# Patient Record
Sex: FEMALE
[De-identification: names, ages, dates, MRNs, and addresses within clinical notes are randomized per-mention and may not be internally consistent; named-entity substitution may affect disease eponyms.]

---

## 2018-06-20 ENCOUNTER — RX RENEWAL (OUTPATIENT)
Age: 28
End: 2018-06-20

## 2018-06-20 PROBLEM — Z00.00 ENCOUNTER FOR PREVENTIVE HEALTH EXAMINATION: Status: ACTIVE | Noted: 2018-06-20

## 2018-08-20 ENCOUNTER — RX RENEWAL (OUTPATIENT)
Age: 28
End: 2018-08-20

## 2018-08-21 ENCOUNTER — RX RENEWAL (OUTPATIENT)
Age: 28
End: 2018-08-21

## 2018-10-24 ENCOUNTER — APPOINTMENT (OUTPATIENT)
Dept: OBGYN | Facility: CLINIC | Age: 28
End: 2018-10-24
Payer: COMMERCIAL

## 2018-10-24 VITALS
WEIGHT: 130 LBS | HEIGHT: 62 IN | BODY MASS INDEX: 23.92 KG/M2 | DIASTOLIC BLOOD PRESSURE: 66 MMHG | SYSTOLIC BLOOD PRESSURE: 102 MMHG

## 2018-10-24 DIAGNOSIS — Z78.9 OTHER SPECIFIED HEALTH STATUS: ICD-10-CM

## 2018-10-24 DIAGNOSIS — Z80.8 FAMILY HISTORY OF MALIGNANT NEOPLASM OF OTHER ORGANS OR SYSTEMS: ICD-10-CM

## 2018-10-24 LAB
HCG UR QL: NEGATIVE
QUALITY CONTROL: YES

## 2018-10-24 PROCEDURE — 99213 OFFICE O/P EST LOW 20 MIN: CPT

## 2018-10-24 PROCEDURE — 81025 URINE PREGNANCY TEST: CPT

## 2019-01-30 RX ORDER — DROSPIRENONE AND ETHINYL ESTRADIOL 0.03MG-3MG
3-0.03 KIT ORAL DAILY
Qty: 90 | Refills: 0 | Status: DISCONTINUED | COMMUNITY
Start: 2018-06-20 | End: 2019-01-30

## 2019-01-31 ENCOUNTER — RX RENEWAL (OUTPATIENT)
Age: 29
End: 2019-01-31

## 2019-06-24 ENCOUNTER — RX RENEWAL (OUTPATIENT)
Age: 29
End: 2019-06-24

## 2019-09-09 ENCOUNTER — RX RENEWAL (OUTPATIENT)
Age: 29
End: 2019-09-09

## 2019-09-16 ENCOUNTER — RX RENEWAL (OUTPATIENT)
Age: 29
End: 2019-09-16

## 2019-09-24 ENCOUNTER — MEDICATION RENEWAL (OUTPATIENT)
Age: 29
End: 2019-09-24

## 2019-11-19 ENCOUNTER — MEDICATION RENEWAL (OUTPATIENT)
Age: 29
End: 2019-11-19

## 2020-01-06 ENCOUNTER — APPOINTMENT (OUTPATIENT)
Dept: OBGYN | Facility: CLINIC | Age: 30
End: 2020-01-06

## 2020-01-08 ENCOUNTER — APPOINTMENT (OUTPATIENT)
Dept: OBGYN | Facility: CLINIC | Age: 30
End: 2020-01-08
Payer: COMMERCIAL

## 2020-01-08 VITALS
BODY MASS INDEX: 24.84 KG/M2 | WEIGHT: 135 LBS | HEIGHT: 62 IN | SYSTOLIC BLOOD PRESSURE: 100 MMHG | DIASTOLIC BLOOD PRESSURE: 62 MMHG

## 2020-01-08 DIAGNOSIS — Z01.419 ENCOUNTER FOR GYNECOLOGICAL EXAMINATION (GENERAL) (ROUTINE) W/OUT ABNORMAL FINDINGS: ICD-10-CM

## 2020-01-08 DIAGNOSIS — Z85.828 PERSONAL HISTORY OF OTHER MALIGNANT NEOPLASM OF SKIN: ICD-10-CM

## 2020-01-08 DIAGNOSIS — Z11.3 ENCOUNTER FOR SCREENING FOR INFECTIONS WITH A PREDOMINANTLY SEXUAL MODE OF TRANSMISSION: ICD-10-CM

## 2020-01-08 DIAGNOSIS — Z80.8 FAMILY HISTORY OF MALIGNANT NEOPLASM OF OTHER ORGANS OR SYSTEMS: ICD-10-CM

## 2020-01-08 PROCEDURE — 99395 PREV VISIT EST AGE 18-39: CPT

## 2020-01-08 RX ORDER — ERGOCALCIFEROL (VITAMIN D2) 1250 MCG
50000 CAPSULE ORAL
Refills: 0 | Status: ACTIVE | COMMUNITY

## 2020-01-08 RX ORDER — FEXOFENADINE HYDROCHLORIDE 60 MG/1
60 TABLET, FILM COATED ORAL
Refills: 0 | Status: ACTIVE | COMMUNITY

## 2020-01-08 NOTE — PHYSICAL EXAM
[Awake] : awake [Acute Distress] : no acute distress [Alert] : alert [Thyroid Nodule] : no thyroid nodule [LAD] : no lymphadenopathy [Mass] : no breast mass [Goiter] : no goiter [Nipple Discharge] : no nipple discharge [Axillary LAD] : no axillary lymphadenopathy [Soft] : soft [Tender] : non tender [None] : no CVA tenderness [Distended] : not distended [H/Smegaly] : no hepatosplenomegaly [Depressed Mood] : not depressed [Oriented x3] : oriented to person, place, and time [Flat Affect] : affect not flat [No Lesions] : no genitalia lesions [Labia Majora] : labia major [Labia Minora] : labia minora [Pap Obtained] : a Pap smear was performed [No Bleeding] : there was no active vaginal bleeding [Normal] : clitoris [Motion Tenderness] : there was no cervical motion tenderness [RRR, No Murmurs] : RRR, no murmurs [Uterine Adnexae] : were not tender and not enlarged [CTAB] : CTAB

## 2020-01-08 NOTE — COUNSELING
[Vitamins/Supplements] : vitamins/supplements [STD (testing, results, tx)] : STD (testing, results, tx) [Contraception] : contraception

## 2020-01-08 NOTE — HISTORY OF PRESENT ILLNESS
[1 Year Ago] : 1 year ago [Good] : being in good health [Healthy Diet] : a healthy diet [Definite:  ___ (Date)] : the last menstrual period was [unfilled] [Reproductive Age] : is of reproductive age [Sexually Active] : is sexually active

## 2020-01-10 LAB
C TRACH RRNA SPEC QL NAA+PROBE: NOT DETECTED
N GONORRHOEA RRNA SPEC QL NAA+PROBE: NOT DETECTED
SOURCE TP AMPLIFICATION: NORMAL

## 2020-01-13 LAB — CYTOLOGY CVX/VAG DOC THIN PREP: NORMAL

## 2020-12-23 PROBLEM — Z01.419 ENCOUNTER FOR ROUTINE GYNECOLOGICAL EXAMINATION: Status: RESOLVED | Noted: 2020-01-08 | Resolved: 2020-12-23

## 2021-01-20 ENCOUNTER — APPOINTMENT (OUTPATIENT)
Dept: OBGYN | Facility: CLINIC | Age: 31
End: 2021-01-20
Payer: COMMERCIAL

## 2021-01-20 VITALS
WEIGHT: 135 LBS | BODY MASS INDEX: 24.84 KG/M2 | DIASTOLIC BLOOD PRESSURE: 70 MMHG | SYSTOLIC BLOOD PRESSURE: 102 MMHG | HEIGHT: 62 IN

## 2021-01-20 DIAGNOSIS — Z01.419 ENCOUNTER FOR GYNECOLOGICAL EXAMINATION (GENERAL) (ROUTINE) W/OUT ABNORMAL FINDINGS: ICD-10-CM

## 2021-01-20 DIAGNOSIS — Z30.41 ENCOUNTER FOR SURVEILLANCE OF CONTRACEPTIVE PILLS: ICD-10-CM

## 2021-01-20 PROCEDURE — 99072 ADDL SUPL MATRL&STAF TM PHE: CPT

## 2021-01-20 PROCEDURE — 99395 PREV VISIT EST AGE 18-39: CPT

## 2021-01-20 NOTE — PLAN
[FreeTextEntry1] : Pap, HPV today\par Rev'd pt to start PNV three months prior to trying to conceive.\par No CHC refill needed today, last refill was 10/20, pt to call when refills needed\par RTO in 1 year or sooner prn

## 2021-01-20 NOTE — PHYSICAL EXAM
[Appropriately responsive] : appropriately responsive [Alert] : alert [No Acute Distress] : no acute distress [No Lymphadenopathy] : no lymphadenopathy [Regular Rate Rhythm] : regular rate rhythm [No Murmurs] : no murmurs [Clear to Auscultation B/L] : clear to auscultation bilaterally [Soft] : soft [Non-tender] : non-tender [Non-distended] : non-distended [No HSM] : No HSM [Oriented x3] : oriented x3 [Examination Of The Breasts] : a normal appearance [No Masses] : no breast masses were palpable [Labia Majora] : normal [Labia Minora] : normal [No Bleeding] : There was no active vaginal bleeding [Normal] : normal [Uterine Adnexae] : normal

## 2021-01-25 LAB
CYTOLOGY CVX/VAG DOC THIN PREP: NORMAL
HPV HIGH+LOW RISK DNA PNL CVX: NOT DETECTED

## 2021-11-18 ENCOUNTER — APPOINTMENT (OUTPATIENT)
Dept: OBGYN | Facility: CLINIC | Age: 31
End: 2021-11-18
Payer: COMMERCIAL

## 2021-11-18 ENCOUNTER — LABORATORY RESULT (OUTPATIENT)
Age: 31
End: 2021-11-18

## 2021-11-18 VITALS
WEIGHT: 135 LBS | HEIGHT: 62 IN | BODY MASS INDEX: 24.84 KG/M2 | DIASTOLIC BLOOD PRESSURE: 60 MMHG | SYSTOLIC BLOOD PRESSURE: 100 MMHG

## 2021-11-18 PROCEDURE — 99213 OFFICE O/P EST LOW 20 MIN: CPT

## 2021-11-19 RX ORDER — DROSPIRENONE AND ETHINYL ESTRADIOL 0.03MG-3MG
3-0.03 KIT ORAL
Qty: 84 | Refills: 3 | Status: DISCONTINUED | COMMUNITY
Start: 2019-01-31 | End: 2021-11-19

## 2021-11-19 NOTE — PLAN
[FreeTextEntry1] : Urine pregnancy test neg\par TSH, hormone testing today\par Will go over during telehealth visit.\par \par RTO prn\par Leyla Thomason CM\par

## 2021-11-19 NOTE — HISTORY OF PRESENT ILLNESS
[Patient reported PAP Smear was normal] : Patient reported PAP Smear was normal [FreeTextEntry1] : Pt would like blood test for hormones.\par \par \par Pt denies trying to conceive. but isn't avoiding pregnancy\par \par Pt reports loosing hair more than usual.\par \par Pt reports having breast tenderness for 10 days last menses. [PapSmeardate] : 1/2021 [TextBox_34] : Tenderness, no masses, discharge

## 2021-11-22 ENCOUNTER — APPOINTMENT (OUTPATIENT)
Dept: OBGYN | Facility: CLINIC | Age: 31
End: 2021-11-22
Payer: COMMERCIAL

## 2021-11-22 DIAGNOSIS — N92.6 IRREGULAR MENSTRUATION, UNSPECIFIED: ICD-10-CM

## 2021-11-22 DIAGNOSIS — E55.9 VITAMIN D DEFICIENCY, UNSPECIFIED: ICD-10-CM

## 2021-11-22 LAB
25(OH)D3 SERPL-MCNC: 22.6 NG/ML
BASOPHILS # BLD AUTO: 0.09 K/UL
BASOPHILS NFR BLD AUTO: 0.8 %
EOSINOPHIL # BLD AUTO: 0.38 K/UL
EOSINOPHIL NFR BLD AUTO: 3.3 %
ESTRADIOL SERPL-MCNC: 44 PG/ML
FOLATE SERPL-MCNC: 14.7 NG/ML
FSH SERPL-MCNC: 2.6 IU/L
HCG UR QL: NEGATIVE
HCT VFR BLD CALC: 39.7 %
HGB BLD-MCNC: 11.7 G/DL
LH SERPL-ACNC: 5.8 IU/L
LYMPHOCYTES # BLD AUTO: 3.7 K/UL
LYMPHOCYTES NFR BLD AUTO: 31.7 %
MAN DIFF?: NORMAL
MCHC RBC-ENTMCNC: 19.9 PG
MCHC RBC-ENTMCNC: 29.5 GM/DL
MCV RBC AUTO: 67.4 FL
MONOCYTES # BLD AUTO: 0.58 K/UL
MONOCYTES NFR BLD AUTO: 5 %
NEUTROPHILS # BLD AUTO: 6.61 K/UL
NEUTROPHILS NFR BLD AUTO: 56.7 %
PLATELET # BLD AUTO: 367 K/UL
PROLACTIN SERPL-MCNC: 19.2 NG/ML
QUALITY CONTROL: YES
RBC # BLD: 5.89 M/UL
RBC # FLD: 18 %
TESTOST BND SERPL-MCNC: 0.6 PG/ML
TESTOSTERONE TOTAL S: 9 NG/DL
TSH SERPL-ACNC: 1.96 UIU/ML
VIT B12 SERPL-MCNC: 715 PG/ML
WBC # FLD AUTO: 11.66 K/UL

## 2021-11-22 PROCEDURE — 98966 PH1 ASSMT&MGMT NQHP 5-10: CPT

## 2021-12-13 NOTE — HISTORY OF PRESENT ILLNESS
[Patient reported PAP Smear was normal] : Patient reported PAP Smear was normal [FreeTextEntry1] : Pt reports she had a normal menses since her last visit on 11/18/21 [PapSmeardate] : 1/21

## 2021-12-13 NOTE — PLAN
[FreeTextEntry1] : Rev'd labs today\par Vit D low, pt advised to take not more than 2000 units per day.\par CBC rev'd, pt reports thalassemia, unsure of the type. Pt advised to see PCP or hematology for further testing.\par All other lab values WNL\par Pt to f/u for annual as scheduled or sooner prn\par \par Leyla Thomason CM

## 2022-01-20 ENCOUNTER — APPOINTMENT (OUTPATIENT)
Dept: OBGYN | Facility: CLINIC | Age: 32
End: 2022-01-20
Payer: COMMERCIAL

## 2022-01-20 VITALS
SYSTOLIC BLOOD PRESSURE: 100 MMHG | WEIGHT: 135 LBS | DIASTOLIC BLOOD PRESSURE: 60 MMHG | HEIGHT: 62 IN | BODY MASS INDEX: 24.84 KG/M2

## 2022-01-20 DIAGNOSIS — R92.2 INCONCLUSIVE MAMMOGRAM: ICD-10-CM

## 2022-01-20 DIAGNOSIS — N64.89 OTHER SPECIFIED DISORDERS OF BREAST: ICD-10-CM

## 2022-01-20 DIAGNOSIS — Z32.02 ENCOUNTER FOR PREGNANCY TEST, RESULT NEGATIVE: ICD-10-CM

## 2022-01-20 DIAGNOSIS — Z01.419 ENCOUNTER FOR GYNECOLOGICAL EXAMINATION (GENERAL) (ROUTINE) W/OUT ABNORMAL FINDINGS: ICD-10-CM

## 2022-01-20 LAB
HCG UR QL: NEGATIVE
QUALITY CONTROL: YES

## 2022-01-20 PROCEDURE — 99395 PREV VISIT EST AGE 18-39: CPT

## 2022-01-20 RX ORDER — DROSPIRENONE AND ETHINYL ESTRADIOL 0.03MG-3MG
3-0.03 KIT ORAL DAILY
Qty: 3 | Refills: 3 | Status: DISCONTINUED | COMMUNITY
Start: 2018-10-24 | End: 2022-01-20

## 2022-01-20 NOTE — HISTORY OF PRESENT ILLNESS
[N] : Patient denies prior pregnancies [No] : Patient does not have concerns regarding sex [FreeTextEntry1] : LMP 1/6/22 x 4 days, moderate\par prior menses 11/20/21, x 4 days. [PapSmeardate] : 1/2020

## 2022-01-20 NOTE — PHYSICAL EXAM
[Appropriately responsive] : appropriately responsive [Alert] : alert [No Acute Distress] : no acute distress [No Lymphadenopathy] : no lymphadenopathy [Regular Rate Rhythm] : regular rate rhythm [No Murmurs] : no murmurs [Clear to Auscultation B/L] : clear to auscultation bilaterally [Soft] : soft [Non-tender] : non-tender [Non-distended] : non-distended [No HSM] : No HSM [Oriented x3] : oriented x3 [FreeTextEntry6] : r breast scar from biopsy of skin, desnse breast tissue in outer quadrants [Examination Of The Breasts] : a normal appearance [Lt > Rt] : the left breast was larger than the right [No Masses] : no breast masses were palpable [Labia Majora] : normal [Labia Minora] : normal [Normal] : normal [Tenderness] : nontender [Uterine Adnexae] : normal

## 2022-01-20 NOTE — PLAN
[FreeTextEntry1] : Pap, HPV today\par Breast sono given\par \par RTO in 1 year or sooner prn\par \par Leyla Thomason CM

## 2022-01-25 LAB
CYTOLOGY CVX/VAG DOC THIN PREP: NORMAL
HPV HIGH+LOW RISK DNA PNL CVX: NOT DETECTED

## 2022-08-08 ENCOUNTER — APPOINTMENT (OUTPATIENT)
Dept: OBGYN | Facility: CLINIC | Age: 32
End: 2022-08-08

## 2022-08-17 ENCOUNTER — APPOINTMENT (OUTPATIENT)
Dept: OBGYN | Facility: CLINIC | Age: 32
End: 2022-08-17

## 2022-08-17 VITALS
SYSTOLIC BLOOD PRESSURE: 126 MMHG | WEIGHT: 145 LBS | HEIGHT: 62 IN | DIASTOLIC BLOOD PRESSURE: 80 MMHG | BODY MASS INDEX: 26.68 KG/M2

## 2022-08-17 DIAGNOSIS — N91.5 OLIGOMENORRHEA, UNSPECIFIED: ICD-10-CM

## 2022-08-17 PROCEDURE — 99213 OFFICE O/P EST LOW 20 MIN: CPT

## 2022-08-23 LAB
ABO + RH PNL BLD: NORMAL
TSH SERPL-ACNC: 2.97 UIU/ML

## 2022-09-13 LAB — ANTI-MUELLERIAN HORMONE: 11.8 NG/ML

## 2022-09-14 ENCOUNTER — APPOINTMENT (OUTPATIENT)
Dept: ANTEPARTUM | Facility: CLINIC | Age: 32
End: 2022-09-14

## 2022-09-14 ENCOUNTER — ASOB RESULT (OUTPATIENT)
Age: 32
End: 2022-09-14

## 2022-09-14 PROCEDURE — 76856 US EXAM PELVIC COMPLETE: CPT

## 2022-09-14 PROCEDURE — 76830 TRANSVAGINAL US NON-OB: CPT | Mod: 59

## 2022-09-14 NOTE — HISTORY OF PRESENT ILLNESS
[Patient reported PAP Smear was normal] : Patient reported PAP Smear was normal [N] : Patient denies prior pregnancies [FreeTextEntry1] : Pt here for f/u regarding oligomenorrhea.\par First menstrual period at age 12, had normal menses until age 15. At that time menses became irregular, coming only 2-4 times per year.\par Pt reports menses was regular only when she took CHC pills. She stopped these 9/2021 and her menses again came every few months.\par LMP 8/6/22 x 6 days, prior menses 3/21, spotting only\par \par \par All lab testing performed 11/21 wnl\par \par Will order TVUS, AMH and repeat TSH. Pt also desires to know her blood type.\par \par Pt also advised to f/u with Physician for further evaluation [PapSmeardate] : 1/22 [LMPDate] : 8/6/22 [MensesLength] : 6

## 2022-09-14 NOTE — PLAN
[FreeTextEntry1] : MFM consult for genetics testing regarding Thalassemia\par AMH, TSH, blood type\par TVUS ordered, pt to make appt, will review by phone\par Pt to schedule f/u with MD as discussed\par Pt to schedule breast sono \par \par RTO prn\par \par Leyla Thomason CM

## 2022-09-15 ENCOUNTER — APPOINTMENT (OUTPATIENT)
Dept: OBGYN | Facility: CLINIC | Age: 32
End: 2022-09-15

## 2022-09-15 VITALS
BODY MASS INDEX: 25.95 KG/M2 | SYSTOLIC BLOOD PRESSURE: 134 MMHG | WEIGHT: 141 LBS | HEIGHT: 62 IN | DIASTOLIC BLOOD PRESSURE: 80 MMHG

## 2022-09-15 DIAGNOSIS — D56.8 OTHER THALASSEMIAS: ICD-10-CM

## 2022-09-15 DIAGNOSIS — N91.1 SECONDARY AMENORRHEA: ICD-10-CM

## 2022-09-15 LAB
HCG UR QL: NEGATIVE
QUALITY CONTROL: YES

## 2022-09-15 PROCEDURE — 36415 COLL VENOUS BLD VENIPUNCTURE: CPT

## 2022-09-15 PROCEDURE — 99214 OFFICE O/P EST MOD 30 MIN: CPT

## 2022-09-15 PROCEDURE — 81025 URINE PREGNANCY TEST: CPT

## 2022-09-15 NOTE — DISCUSSION/SUMMARY
[FreeTextEntry1] : ZOFIA BEAR is a 32 year G0 here for oligoamenorrhea, likely pcos, possible thalassemia\par - aub labs, hbg electro sent\par - will call with results\par - discussed options if pcos - pt will think about it

## 2022-09-15 NOTE — HISTORY OF PRESENT ILLNESS
[FreeTextEntry1] : ZOFIA BEAR is a 32 year F G0  here to follow up on oligoamenorrhea. Carmelita had w/u 11/2021 - it was all normal but she was recently off OCPs then so probably falsely normal. facial hair none. some back acne\par wants to get pregnant in about 2-3 years\par \par BC: withdrawal\par Gynhx: Menarche at 11 yo;  Irreg menses, No h/o STIs/fibroids/cysts/abn paps\par Obhx:nullip\par \par Last mammo:n/a\par Last Colonoscopy:n/a\par Last Pap smear: 1/2022\par Last dexa:n/a\par

## 2022-09-19 ENCOUNTER — ASOB RESULT (OUTPATIENT)
Age: 32
End: 2022-09-19

## 2022-09-19 ENCOUNTER — APPOINTMENT (OUTPATIENT)
Dept: MATERNAL FETAL MEDICINE | Facility: CLINIC | Age: 32
End: 2022-09-19

## 2022-09-19 PROCEDURE — 99202 OFFICE O/P NEW SF 15 MIN: CPT | Mod: 25,95

## 2022-10-03 ENCOUNTER — NON-APPOINTMENT (OUTPATIENT)
Age: 32
End: 2022-10-03

## 2022-10-03 LAB
% FREE TESTOSTERONE - ESO: 0.6 %
DHEA-SULFATE, SERUM: 76 UG/DL
ESTRADIOL SERPL-MCNC: 222 PG/ML
FREE TESTOSTERONE - ESO: 2.2 PG/ML
FSH SERPL-MCNC: 2.6 IU/L
HGB A MFR BLD: 93.3 %
HGB A2 MFR BLD: 5.3 %
HGB F MFR BLD: 1.4 %
HGB FRACT BLD-IMP: NORMAL
LH SERPL-ACNC: 10.2 IU/L
PROGEST SERPL-MCNC: 9.9 NG/ML
PROLACTIN SERPL-MCNC: 18.1 NG/ML
SHBG-ESOTERIX: 66.2 NMOL/L
TESTOST SERPL-MCNC: 26.1 NG/DL
TESTOSTERONE SERUM - ESO: 36 NG/DL

## 2023-01-23 ENCOUNTER — OFFICE (OUTPATIENT)
Dept: URBAN - METROPOLITAN AREA CLINIC 12 | Facility: CLINIC | Age: 33
Setting detail: OPHTHALMOLOGY
End: 2023-01-23
Payer: COMMERCIAL

## 2023-01-23 DIAGNOSIS — Z20.822: ICD-10-CM

## 2023-01-23 DIAGNOSIS — Z01.812: ICD-10-CM

## 2023-01-23 DIAGNOSIS — H52.13: ICD-10-CM

## 2023-01-23 PROCEDURE — N/C NO CHARGE: Performed by: OPHTHALMOLOGY

## 2023-01-23 PROCEDURE — SCREF LASIK EVAL: Performed by: OPHTHALMOLOGY

## 2023-01-23 ASSESSMENT — REFRACTION_CURRENTRX
OD_AXIS: 086
OD_CYLINDER: -0.25
OS_OVR_VA: 20/
OS_SPHERE: -8.25
OS_OVR_VA: 20/
OS_AXIS: 0
OD_SPHERE: -8.00
OS_SPHERE: -8.25
OD_OVR_VA: 20/
OS_CYLINDER: 0.00
OS_AXIS: 0
OD_SPHERE: -8.00
OS_VPRISM_DIRECTION: SV
OD_VPRISM_DIRECTION: SV
OD_AXIS: 086
OD_VPRISM_DIRECTION: SV
OD_OVR_VA: 20/
OS_VPRISM_DIRECTION: SV
OS_CYLINDER: 0.00
OD_CYLINDER: -0.25

## 2023-01-23 ASSESSMENT — KERATOMETRY
OS_K2POWER_DIOPTERS: 39.00
OS_K1POWER_DIOPTERS: 38.75
OS_AXISANGLE_DEGREES: 68
OS_AXISANGLE_DEGREES: 68
OD_K2POWER_DIOPTERS: 38.50
OD_AXISANGLE_DEGREES: 99
OD_K1POWER_DIOPTERS: 38.00
OD_K2POWER_DIOPTERS: 38.50
OD_AXISANGLE_DEGREES: 99
OS_K1POWER_DIOPTERS: 38.75
OD_K1POWER_DIOPTERS: 38.00
OS_K2POWER_DIOPTERS: 39.00

## 2023-01-23 ASSESSMENT — PACHYMETRY
OS_CT_UM: 516
OD_CT_CORRECTION: 1
OS_CT_CORRECTION: 2
OD_CT_UM: 523

## 2023-01-23 ASSESSMENT — CONFRONTATIONAL VISUAL FIELD TEST (CVF)
OS_FINDINGS: FULL
OD_FINDINGS: FULL

## 2023-01-23 ASSESSMENT — REFRACTION_MANIFEST
OS_SPHERE: -6.75
OD_SPHERE: -1.25
OD_AXIS: 51
OS_VA1: 20/20-2
OD_VA1: 20/20
OD_CYLINDER: -0.25
OD_SPHERE: -7.96
OS_CYLINDER: SPH
OS_SPHERE: -1.25
OD_SPHERE: -1.25
OD_CYLINDER: -0.25
OD_VA1: 20/20
OS_VA1: 20/20-2
OS_AXIS: 8
OD_CYLINDER: -0.25
OS_CYLINDER: SPH
OS_SPHERE: -6.75
OS_AXIS: 8
OD_CYLINDER: -0.25
OD_AXIS: 51
OD_SPHERE: -7.96
OD_AXIS: 60
OU_VA: 20/20-1
OS_SPHERE: -1.25
OS_CYLINDER: --0.75
OD_AXIS: 60
OS_CYLINDER: --0.75
OU_VA: 20/20-1

## 2023-01-23 ASSESSMENT — TONOMETRY
OD_IOP_MMHG: 11
OS_IOP_MMHG: 11

## 2023-01-23 ASSESSMENT — REFRACTION_AUTOREFRACTION
OD_CYLINDER: -0.25
OD_AXIS: 51
OD_CYLINDER: -0.25
OS_CYLINDER: -0.25
OS_AXIS: 166
OD_AXIS: 51
OD_SPHERE: -1.25
OS_CYLINDER: -0.25
OS_AXIS: 166
OD_SPHERE: -1.25
OS_SPHERE: -1.25
OS_SPHERE: -1.25

## 2023-01-23 ASSESSMENT — AXIALLENGTH_DERIVED
OD_AL: 26.3434
OD_AL: 30.06
OS_AL: 26.0602
OD_AL: 26.3434
OS_AL: 26.0602
OD_AL: 30.06

## 2023-01-23 ASSESSMENT — SPHEQUIV_DERIVED
OD_SPHEQUIV: -8.09
OD_SPHEQUIV: -8.09
OD_SPHEQUIV: -1.375
OD_SPHEQUIV: -1.375
OS_SPHEQUIV: -1.375
OD_SPHEQUIV: -1.375
OD_SPHEQUIV: -1.375
OS_SPHEQUIV: -1.375

## 2023-01-23 ASSESSMENT — VISUAL ACUITY
OS_BCVA: 20/60-1
OS_BCVA: 20/60-1
OD_BCVA: 20/60-1
OD_BCVA: 20/60-1

## 2023-01-25 ENCOUNTER — OTHER LOCATION (OUTPATIENT)
Dept: URBAN - METROPOLITAN AREA LASIK CENTER 6 | Facility: LASIK CENTER | Age: 33
Setting detail: OPHTHALMOLOGY
End: 2023-01-25

## 2023-01-25 ENCOUNTER — RX ONLY (RX ONLY)
Age: 33
End: 2023-01-25

## 2023-01-25 DIAGNOSIS — H52.13: ICD-10-CM

## 2023-01-25 PROCEDURE — 65760A LASIK: Performed by: OPHTHALMOLOGY

## 2023-01-25 ASSESSMENT — REFRACTION_CURRENTRX
OD_OVR_VA: 20/
OD_AXIS: 086
OD_VPRISM_DIRECTION: SV
OD_CYLINDER: -0.25
OS_VPRISM_DIRECTION: SV
OS_SPHERE: -8.25
OS_OVR_VA: 20/
OS_AXIS: 0
OS_CYLINDER: 0.00
OD_SPHERE: -8.00

## 2023-01-25 ASSESSMENT — REFRACTION_AUTOREFRACTION
OD_CYLINDER: -0.25
OD_SPHERE: -1.25
OS_AXIS: 166
OD_AXIS: 51
OS_SPHERE: -1.25
OS_CYLINDER: -0.25

## 2023-01-25 ASSESSMENT — SPHEQUIV_DERIVED
OS_SPHEQUIV: -1.375
OD_SPHEQUIV: -8.09
OD_SPHEQUIV: -1.375
OD_SPHEQUIV: -1.375

## 2023-01-25 ASSESSMENT — VISUAL ACUITY
OS_BCVA: 20/60-1
OD_BCVA: 20/60-1

## 2023-01-25 ASSESSMENT — REFRACTION_MANIFEST
OD_SPHERE: -7.96
OS_SPHERE: -1.25
OS_CYLINDER: SPH
OU_VA: 20/20-1
OD_CYLINDER: -0.25
OD_VA1: 20/20
OD_AXIS: 51
OS_VA1: 20/20-2
OD_AXIS: 60
OS_AXIS: 8
OS_SPHERE: -6.75
OD_CYLINDER: -0.25
OS_CYLINDER: --0.75
OD_SPHERE: -1.25

## 2023-01-25 ASSESSMENT — KERATOMETRY
OS_AXISANGLE_DEGREES: 68
OS_K2POWER_DIOPTERS: 39.00
OS_K1POWER_DIOPTERS: 38.75
OD_K1POWER_DIOPTERS: 38.00
OD_AXISANGLE_DEGREES: 99
OD_K2POWER_DIOPTERS: 38.50

## 2023-01-25 ASSESSMENT — AXIALLENGTH_DERIVED
OD_AL: 30.06
OD_AL: 26.3434
OS_AL: 26.0602
OD_AL: 26.3434

## 2023-01-26 ENCOUNTER — OFFICE (OUTPATIENT)
Dept: URBAN - METROPOLITAN AREA CLINIC 12 | Facility: CLINIC | Age: 33
Setting detail: OPHTHALMOLOGY
End: 2023-01-26
Payer: COMMERCIAL

## 2023-01-26 DIAGNOSIS — H52.13: ICD-10-CM

## 2023-01-26 PROCEDURE — 99024 POSTOP FOLLOW-UP VISIT: CPT | Performed by: OPHTHALMOLOGY

## 2023-01-26 ASSESSMENT — AXIALLENGTH_DERIVED
OD_AL: 25.0375
OD_AL: 28.37
OD_AL: 24.9828

## 2023-01-26 ASSESSMENT — REFRACTION_CURRENTRX
OD_SPHERE: -8.00
OD_AXIS: 086
OS_VPRISM_DIRECTION: SV
OS_OVR_VA: 20/
OD_OVR_VA: 20/
OS_CYLINDER: 0.00
OD_VPRISM_DIRECTION: SV
OD_CYLINDER: -0.25
OS_AXIS: 0
OS_SPHERE: -8.25

## 2023-01-26 ASSESSMENT — REFRACTION_MANIFEST
OD_CYLINDER: -0.25
OD_SPHERE: -1.25
OS_SPHERE: -6.75
OS_VA1: 20/20-2
OD_AXIS: 60
OD_CYLINDER: -0.25
OD_SPHERE: -7.96
OS_SPHERE: -1.25
OD_AXIS: 51
OS_AXIS: 8
OD_VA1: 20/20
OS_CYLINDER: --0.75
OS_CYLINDER: SPH
OU_VA: 20/20-1

## 2023-01-26 ASSESSMENT — REFRACTION_AUTOREFRACTION
OD_CYLINDER: -1.00
OD_AXIS: 054
OD_SPHERE: -0.75
OS_SPHERE: -1.25
OS_CYLINDER: SPHERE

## 2023-01-26 ASSESSMENT — KERATOMETRY
OS_AXISANGLE_DEGREES: 146
OD_K2POWER_DIOPTERS: 46.75
OD_K1POWER_DIOPTERS: 35.75
OD_AXISANGLE_DEGREES: 130
OS_K2POWER_DIOPTERS: 37.75
OS_K1POWER_DIOPTERS: 37.50

## 2023-01-26 ASSESSMENT — SPHEQUIV_DERIVED
OD_SPHEQUIV: -1.375
OD_SPHEQUIV: -1.25
OD_SPHEQUIV: -8.09

## 2023-01-26 ASSESSMENT — CONFRONTATIONAL VISUAL FIELD TEST (CVF)
OS_FINDINGS: FULL
OD_FINDINGS: FULL

## 2023-01-26 ASSESSMENT — VISUAL ACUITY
OD_BCVA: 20/100-
OS_BCVA: 20/70-

## 2023-01-30 ENCOUNTER — OFFICE (OUTPATIENT)
Dept: URBAN - METROPOLITAN AREA CLINIC 12 | Facility: CLINIC | Age: 33
Setting detail: OPHTHALMOLOGY
End: 2023-01-30
Payer: COMMERCIAL

## 2023-01-30 DIAGNOSIS — H52.13: ICD-10-CM

## 2023-01-30 PROCEDURE — 99024 POSTOP FOLLOW-UP VISIT: CPT | Performed by: OPHTHALMOLOGY

## 2023-01-30 ASSESSMENT — REFRACTION_MANIFEST
OD_VA1: 20/20
OD_AXIS: 51
OD_AXIS: 60
OS_CYLINDER: --0.75
OD_CYLINDER: -0.25
OU_VA: 20/20-1
OD_CYLINDER: -0.25
OD_SPHERE: -7.96
OS_AXIS: 8
OD_SPHERE: -1.25
OS_SPHERE: -1.25
OS_CYLINDER: SPH
OS_SPHERE: -6.75
OS_VA1: 20/20-2

## 2023-01-30 ASSESSMENT — REFRACTION_CURRENTRX
OD_VPRISM_DIRECTION: SV
OD_CYLINDER: -0.25
OD_AXIS: 086
OS_AXIS: 0
OS_CYLINDER: 0.00
OS_SPHERE: -8.25
OS_OVR_VA: 20/
OD_SPHERE: -8.00
OD_OVR_VA: 20/
OS_VPRISM_DIRECTION: SV

## 2023-01-30 ASSESSMENT — KERATOMETRY
OS_K2POWER_DIOPTERS: 39.50
OD_K2POWER_DIOPTERS: 39.00
OS_AXISANGLE_DEGREES: .76
OD_K1POWER_DIOPTERS: 37.25
OS_K1POWER_DIOPTERS: 38.50
OD_AXISANGLE_DEGREES: 062

## 2023-01-30 ASSESSMENT — SPHEQUIV_DERIVED
OD_SPHEQUIV: -8.09
OD_SPHEQUIV: -1.375
OS_SPHEQUIV: -1.375
OD_SPHEQUIV: -2.625

## 2023-01-30 ASSESSMENT — REFRACTION_AUTOREFRACTION
OD_SPHERE: -1.00
OS_CYLINDER: -1.25
OD_CYLINDER: -3.25
OS_SPHERE: -0.75
OS_AXIS: 162
OD_AXIS: 146

## 2023-01-30 ASSESSMENT — CONFRONTATIONAL VISUAL FIELD TEST (CVF)
OD_FINDINGS: FULL
OS_FINDINGS: FULL

## 2023-01-30 ASSESSMENT — AXIALLENGTH_DERIVED
OD_AL: 26.4007
OS_AL: 26.0043
OD_AL: 27.025
OD_AL: 30.14

## 2023-01-30 ASSESSMENT — VISUAL ACUITY
OS_BCVA: 20/40
OD_BCVA: 20/40

## 2023-02-27 ENCOUNTER — OFFICE (OUTPATIENT)
Dept: URBAN - METROPOLITAN AREA CLINIC 12 | Facility: CLINIC | Age: 33
Setting detail: OPHTHALMOLOGY
End: 2023-02-27
Payer: COMMERCIAL

## 2023-02-27 DIAGNOSIS — H52.13: ICD-10-CM

## 2023-02-27 PROCEDURE — 99024 POSTOP FOLLOW-UP VISIT: CPT | Performed by: OPHTHALMOLOGY

## 2023-02-27 ASSESSMENT — AXIALLENGTH_DERIVED
OD_AL: 31.31
OD_AL: 26.2879
OD_AL: 27.29
OS_AL: 25.7299

## 2023-02-27 ASSESSMENT — REFRACTION_AUTOREFRACTION
OS_AXIS: 171
OD_AXIS: 158
OS_SPHERE: +1.00
OD_CYLINDER: -0.75
OS_CYLINDER: -0.75
OD_SPHERE: +1.00

## 2023-02-27 ASSESSMENT — REFRACTION_MANIFEST
OS_SPHERE: -6.75
OD_SPHERE: -1.25
OS_VA1: 20/20-2
OD_VA1: 20/20
OS_CYLINDER: SPH
OD_SPHERE: -7.96
OS_CYLINDER: --0.75
OD_CYLINDER: -0.25
OD_AXIS: 51
OU_VA: 20/20-1
OS_AXIS: 8
OD_AXIS: 60
OS_SPHERE: -1.25
OD_CYLINDER: -0.25

## 2023-02-27 ASSESSMENT — REFRACTION_CURRENTRX
OS_OVR_VA: 20/
OD_CYLINDER: -0.25
OD_OVR_VA: 20/
OD_SPHERE: -8.00
OS_SPHERE: -8.25
OD_AXIS: 086
OS_CYLINDER: 0.00
OD_VPRISM_DIRECTION: SV
OS_AXIS: 0
OS_VPRISM_DIRECTION: SV

## 2023-02-27 ASSESSMENT — VISUAL ACUITY
OD_BCVA: 20/50
OS_BCVA: 20/30-1

## 2023-02-27 ASSESSMENT — SPHEQUIV_DERIVED
OD_SPHEQUIV: 0.625
OS_SPHEQUIV: 0.625
OD_SPHEQUIV: -8.09
OD_SPHEQUIV: -1.375

## 2023-02-27 ASSESSMENT — KERATOMETRY
OD_K1POWER_DIOPTERS: 35.75
OS_K2POWER_DIOPTERS: 38.00
OD_K2POWER_DIOPTERS: 36.75
OS_K1POWER_DIOPTERS: 37.00
OS_AXISANGLE_DEGREES: 080
OD_AXISANGLE_DEGREES: 072

## 2023-02-27 ASSESSMENT — CONFRONTATIONAL VISUAL FIELD TEST (CVF)
OD_FINDINGS: FULL
OS_FINDINGS: FULL

## 2023-06-26 ENCOUNTER — OFFICE (OUTPATIENT)
Dept: URBAN - METROPOLITAN AREA CLINIC 12 | Facility: CLINIC | Age: 33
Setting detail: OPHTHALMOLOGY
End: 2023-06-26
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
## 2023-06-26 PROCEDURE — NO SHOW FE NO SHOW FEE: Performed by: OPHTHALMOLOGY

## 2024-09-06 ENCOUNTER — OFFICE (OUTPATIENT)
Dept: URBAN - METROPOLITAN AREA CLINIC 38 | Facility: CLINIC | Age: 34
Setting detail: OPHTHALMOLOGY
End: 2024-09-06
Payer: COMMERCIAL

## 2024-09-06 DIAGNOSIS — G43.109: ICD-10-CM

## 2024-09-06 DIAGNOSIS — H35.40: ICD-10-CM

## 2024-09-06 DIAGNOSIS — H35.412: ICD-10-CM

## 2024-09-06 DIAGNOSIS — H01.004: ICD-10-CM

## 2024-09-06 DIAGNOSIS — H17.9: ICD-10-CM

## 2024-09-06 DIAGNOSIS — H52.13: ICD-10-CM

## 2024-09-06 DIAGNOSIS — H00.015: ICD-10-CM

## 2024-09-06 DIAGNOSIS — H01.001: ICD-10-CM

## 2024-09-06 PROCEDURE — 92014 COMPRE OPH EXAM EST PT 1/>: CPT | Performed by: OPHTHALMOLOGY

## 2024-09-06 ASSESSMENT — CONFRONTATIONAL VISUAL FIELD TEST (CVF)
OD_FINDINGS: FULL
OS_FINDINGS: FULL

## 2024-09-06 ASSESSMENT — LID EXAM ASSESSMENTS
OD_BLEPHARITIS: RUL T
OS_BLEPHARITIS: LUL T